# Patient Record
Sex: FEMALE | Race: BLACK OR AFRICAN AMERICAN | NOT HISPANIC OR LATINO
[De-identification: names, ages, dates, MRNs, and addresses within clinical notes are randomized per-mention and may not be internally consistent; named-entity substitution may affect disease eponyms.]

---

## 2023-07-18 ENCOUNTER — NON-APPOINTMENT (OUTPATIENT)
Age: 68
End: 2023-07-18

## 2023-09-06 ENCOUNTER — NON-APPOINTMENT (OUTPATIENT)
Age: 68
End: 2023-09-06

## 2023-09-06 PROBLEM — Z00.00 ENCOUNTER FOR PREVENTIVE HEALTH EXAMINATION: Status: ACTIVE | Noted: 2023-09-06

## 2023-09-07 ENCOUNTER — APPOINTMENT (OUTPATIENT)
Dept: ENDOCRINOLOGY | Facility: CLINIC | Age: 68
End: 2023-09-07
Payer: MEDICARE

## 2023-09-07 VITALS — BODY MASS INDEX: 34.72 KG/M2 | HEIGHT: 63 IN

## 2023-09-07 VITALS — HEART RATE: 108 BPM | DIASTOLIC BLOOD PRESSURE: 85 MMHG | SYSTOLIC BLOOD PRESSURE: 167 MMHG | WEIGHT: 196 LBS

## 2023-09-07 DIAGNOSIS — I10 ESSENTIAL (PRIMARY) HYPERTENSION: ICD-10-CM

## 2023-09-07 DIAGNOSIS — E27.8 OTHER SPECIFIED DISORDERS OF ADRENAL GLAND: ICD-10-CM

## 2023-09-07 DIAGNOSIS — Z79.4 TYPE 2 DIABETES MELLITUS WITH HYPERGLYCEMIA: ICD-10-CM

## 2023-09-07 DIAGNOSIS — E11.65 TYPE 2 DIABETES MELLITUS WITH HYPERGLYCEMIA: ICD-10-CM

## 2023-09-07 DIAGNOSIS — E78.5 HYPERLIPIDEMIA, UNSPECIFIED: ICD-10-CM

## 2023-09-07 DIAGNOSIS — E66.9 OBESITY, UNSPECIFIED: ICD-10-CM

## 2023-09-07 PROCEDURE — 82962 GLUCOSE BLOOD TEST: CPT

## 2023-09-07 PROCEDURE — 99205 OFFICE O/P NEW HI 60 MIN: CPT | Mod: 25

## 2023-09-08 LAB — GLUCOSE BLDC GLUCOMTR-MCNC: 267

## 2023-09-08 NOTE — HISTORY OF PRESENT ILLNESS
[FreeTextEntry1] : 68 y/o F w/ Hx of DM2, HTN, HLD, obesity here for initial evaluation and management of diabetes generally feels well and endorses no acute complaints. reports diagnosis ~ 30 y/a after last pregnancy when she developed gestational DM2, had always been on pills until 5 y/a when insulin was started, reports fair control over the years, denies micro vasc damage.. recent A1Cs have risen. admits to sedentary lifestyle and worsening dietary indiscretions. compliant w/ lantus 65 units QHS, metformin max dose and recently initiated jardiance 10 mg QD, denies LUTS. She otherwise denies any f/c, CP, SOB, palpitations, tremors, depressed mood, anxiety, palpitations, n/v, stool/urinary abn, skin/weight changes, heat/cold intolerance, HAs, breast/nipple changes, polyuria/polydipsia/nocturia or other complaints.

## 2023-09-08 NOTE — ASSESSMENT
[Long Term Vascular Complications] : long term vascular complications of diabetes [Carbohydrate Consistent Diet] : carbohydrate consistent diet [Importance of Diet and Exercise] : importance of diet and exercise to improve glycemic control, achieve weight loss and improve cardiovascular health [Hypoglycemia Management] : hypoglycemia management [Action and use of Insulin] : action and use of short and long-acting insulin [Self Monitoring of Blood Glucose] : self monitoring of blood glucose [Injection Technique, Storage, Sharps Disposal] : injection technique, storage, and sharps disposal [Weight Loss] : weight loss [FreeTextEntry1] : 1)	Continue taking Metformin 1000 mg tablet twice a day 2)	Continue taking Jardiance 10 mg tablet in the morning 3)	Start taking the new medication called Ozempic, 0.25 mg injection once a week for 4 weeks. If well tolerated, you may increase to 0.5 mg once a week. 4)	Reduce your Basaglar insulin to 50 units once a day. 5)	Call Dr. Gifford at  if digestive side effects or low sugar episodes less than 80 occur.    1) DM2: Uncontrolled, A1C on 8/2023 at 9%. target of <7%. Natural hx of the disease and importance of treatment targets discussed at length, she verbalized understanding. ADA diet and importance of exercise discussed at length. Plan is to cont metformin to full dose and introduce GLP-1 agonist today (ozempic). Reduce Lantus to 50 units QHS, titration instructions provided. cont jardiance low dose. Refer to Nutritionist today. We dyllan check microalbumin, lipids and labs on the NV. Discuss vaccines and podiatry/opthalmology referrals on NV  2) Weight gain:  complicated by DM2. Discussed medical  strategies. Pt would like to try lifestyle modifications and GLP-1 agonist therapy at this time. Reassess on the NV for at least ~5% TBW loss.  3) Essential HTN: Pt is at goal BP and on an ACE inh. Reassess microalbumin prior to the NV.   4) Dyslipidemia: Pt is on a moderate intensity statin. REassess lipids on the next visit. LDL target <100.

## 2023-09-21 ENCOUNTER — TRANSCRIPTION ENCOUNTER (OUTPATIENT)
Age: 68
End: 2023-09-21

## 2024-03-06 ENCOUNTER — APPOINTMENT (OUTPATIENT)
Dept: ENDOCRINOLOGY | Facility: CLINIC | Age: 69
End: 2024-03-06